# Patient Record
Sex: MALE | Race: WHITE
[De-identification: names, ages, dates, MRNs, and addresses within clinical notes are randomized per-mention and may not be internally consistent; named-entity substitution may affect disease eponyms.]

---

## 2019-05-30 ENCOUNTER — HOSPITAL ENCOUNTER (INPATIENT)
Dept: HOSPITAL 8 - ED | Age: 84
LOS: 2 days | Discharge: HOME | DRG: 281 | End: 2019-06-01
Attending: HOSPITALIST | Admitting: HOSPITALIST
Payer: MEDICARE

## 2019-05-30 VITALS — WEIGHT: 226.19 LBS | HEIGHT: 71 IN | BODY MASS INDEX: 31.67 KG/M2

## 2019-05-30 VITALS — DIASTOLIC BLOOD PRESSURE: 82 MMHG | SYSTOLIC BLOOD PRESSURE: 179 MMHG

## 2019-05-30 VITALS — DIASTOLIC BLOOD PRESSURE: 97 MMHG | SYSTOLIC BLOOD PRESSURE: 216 MMHG

## 2019-05-30 DIAGNOSIS — Z88.8: ICD-10-CM

## 2019-05-30 DIAGNOSIS — I48.92: ICD-10-CM

## 2019-05-30 DIAGNOSIS — K21.9: ICD-10-CM

## 2019-05-30 DIAGNOSIS — D69.6: ICD-10-CM

## 2019-05-30 DIAGNOSIS — G89.29: ICD-10-CM

## 2019-05-30 DIAGNOSIS — Z95.1: ICD-10-CM

## 2019-05-30 DIAGNOSIS — Z66: ICD-10-CM

## 2019-05-30 DIAGNOSIS — Z86.73: ICD-10-CM

## 2019-05-30 DIAGNOSIS — N40.0: ICD-10-CM

## 2019-05-30 DIAGNOSIS — I48.2: ICD-10-CM

## 2019-05-30 DIAGNOSIS — Z80.8: ICD-10-CM

## 2019-05-30 DIAGNOSIS — Z87.891: ICD-10-CM

## 2019-05-30 DIAGNOSIS — Z98.1: ICD-10-CM

## 2019-05-30 DIAGNOSIS — I12.9: ICD-10-CM

## 2019-05-30 DIAGNOSIS — I21.4: Primary | ICD-10-CM

## 2019-05-30 DIAGNOSIS — I25.10: ICD-10-CM

## 2019-05-30 DIAGNOSIS — Z82.49: ICD-10-CM

## 2019-05-30 DIAGNOSIS — D68.69: ICD-10-CM

## 2019-05-30 DIAGNOSIS — Z86.79: ICD-10-CM

## 2019-05-30 DIAGNOSIS — Z95.0: ICD-10-CM

## 2019-05-30 DIAGNOSIS — Z79.01: ICD-10-CM

## 2019-05-30 DIAGNOSIS — E78.5: ICD-10-CM

## 2019-05-30 DIAGNOSIS — J44.9: ICD-10-CM

## 2019-05-30 DIAGNOSIS — N18.4: ICD-10-CM

## 2019-05-30 LAB
ALBUMIN SERPL-MCNC: 3.5 G/DL (ref 3.4–5)
ANION GAP SERPL CALC-SCNC: 5 MMOL/L (ref 5–15)
BASOPHILS # BLD AUTO: 0.01 X10^3/UL (ref 0–0.1)
BASOPHILS NFR BLD AUTO: 0 % (ref 0–1)
CALCIUM SERPL-MCNC: 9.5 MG/DL (ref 8.5–10.1)
CHLORIDE SERPL-SCNC: 110 MMOL/L (ref 98–107)
CREAT SERPL-MCNC: 1.6 MG/DL (ref 0.7–1.3)
EOSINOPHIL # BLD AUTO: 0.03 X10^3/UL (ref 0–0.4)
EOSINOPHIL NFR BLD AUTO: 1 % (ref 1–7)
ERYTHROCYTE [DISTWIDTH] IN BLOOD BY AUTOMATED COUNT: 14.4 % (ref 9.4–14.8)
LYMPHOCYTES # BLD AUTO: 1.37 X10^3/UL (ref 1–3.4)
LYMPHOCYTES NFR BLD AUTO: 26 % (ref 22–44)
MCH RBC QN AUTO: 30.3 PG (ref 27.5–34.5)
MCHC RBC AUTO-ENTMCNC: 32.5 G/DL (ref 33.2–36.2)
MCV RBC AUTO: 93 FL (ref 81–97)
MD: NO
MONOCYTES # BLD AUTO: 0.68 X10^3/UL (ref 0.2–0.8)
MONOCYTES NFR BLD AUTO: 13 % (ref 2–9)
NEUTROPHILS # BLD AUTO: 3.1 X10^3/UL (ref 1.8–6.8)
NEUTROPHILS NFR BLD AUTO: 60 % (ref 42–75)
PLATELET # BLD AUTO: 115 X10^3/UL (ref 130–400)
PMV BLD AUTO: 8.2 FL (ref 7.4–10.4)
RBC # BLD AUTO: 4.73 X10^6/UL (ref 4.38–5.82)
TROPONIN I SERPL-MCNC: 0.08 NG/ML (ref 0–0.04)

## 2019-05-30 PROCEDURE — 85025 COMPLETE CBC W/AUTO DIFF WBC: CPT

## 2019-05-30 PROCEDURE — 80053 COMPREHEN METABOLIC PANEL: CPT

## 2019-05-30 PROCEDURE — 96360 HYDRATION IV INFUSION INIT: CPT

## 2019-05-30 PROCEDURE — 84100 ASSAY OF PHOSPHORUS: CPT

## 2019-05-30 PROCEDURE — 82962 GLUCOSE BLOOD TEST: CPT

## 2019-05-30 PROCEDURE — A9502 TC99M TETROFOSMIN: HCPCS

## 2019-05-30 PROCEDURE — 93005 ELECTROCARDIOGRAM TRACING: CPT

## 2019-05-30 PROCEDURE — C9898 INPNT STAY RADIOLABELED ITEM: HCPCS

## 2019-05-30 PROCEDURE — 80061 LIPID PANEL: CPT

## 2019-05-30 PROCEDURE — 84484 ASSAY OF TROPONIN QUANT: CPT

## 2019-05-30 PROCEDURE — 36415 COLL VENOUS BLD VENIPUNCTURE: CPT

## 2019-05-30 PROCEDURE — 71045 X-RAY EXAM CHEST 1 VIEW: CPT

## 2019-05-30 PROCEDURE — 83735 ASSAY OF MAGNESIUM: CPT

## 2019-05-30 PROCEDURE — 80048 BASIC METABOLIC PNL TOTAL CA: CPT

## 2019-05-30 PROCEDURE — 93017 CV STRESS TEST TRACING ONLY: CPT

## 2019-05-30 PROCEDURE — 82040 ASSAY OF SERUM ALBUMIN: CPT

## 2019-05-30 PROCEDURE — 78452 HT MUSCLE IMAGE SPECT MULT: CPT

## 2019-05-30 PROCEDURE — 85379 FIBRIN DEGRADATION QUANT: CPT

## 2019-05-30 PROCEDURE — 71275 CT ANGIOGRAPHY CHEST: CPT

## 2019-05-30 PROCEDURE — 93306 TTE W/DOPPLER COMPLETE: CPT

## 2019-05-30 RX ADMIN — LOVASTATIN SCH MG: 40 TABLET ORAL at 21:49

## 2019-05-30 RX ADMIN — CARVEDILOL SCH MG: 6.25 TABLET, FILM COATED ORAL at 19:26

## 2019-05-30 RX ADMIN — SODIUM CHLORIDE, PRESERVATIVE FREE SCH ML: 5 INJECTION INTRAVENOUS at 21:49

## 2019-05-30 RX ADMIN — APIXABAN SCH MG: 5 TABLET, FILM COATED ORAL at 21:49

## 2019-05-30 NOTE — NUR
Pt to be admitted to [facility], room 506. Report called to Jose.

## 2019-05-30 NOTE — NUR
pt upright on gurney awake & comfortable, responds approp to staff, NAD at 
rest, comfort measures provided, wife at BS, call light within reach.

## 2019-05-30 NOTE — NUR
pt remains upright on gurney awake & comfortable, responds approp to staff, NAD 
at rest, comfort measures provided, wife at BS, call light within reach.

## 2019-05-31 VITALS — SYSTOLIC BLOOD PRESSURE: 179 MMHG | DIASTOLIC BLOOD PRESSURE: 94 MMHG

## 2019-05-31 VITALS — DIASTOLIC BLOOD PRESSURE: 63 MMHG | SYSTOLIC BLOOD PRESSURE: 131 MMHG

## 2019-05-31 VITALS — SYSTOLIC BLOOD PRESSURE: 131 MMHG | DIASTOLIC BLOOD PRESSURE: 70 MMHG

## 2019-05-31 VITALS — SYSTOLIC BLOOD PRESSURE: 152 MMHG | DIASTOLIC BLOOD PRESSURE: 77 MMHG

## 2019-05-31 VITALS — SYSTOLIC BLOOD PRESSURE: 180 MMHG | DIASTOLIC BLOOD PRESSURE: 90 MMHG

## 2019-05-31 LAB
ALBUMIN SERPL-MCNC: 3.2 G/DL (ref 3.4–5)
ALP SERPL-CCNC: 75 U/L (ref 45–117)
ALT SERPL-CCNC: 19 U/L (ref 12–78)
ANION GAP SERPL CALC-SCNC: 7 MMOL/L (ref 5–15)
BASOPHILS # BLD AUTO: 0.02 X10^3/UL (ref 0–0.1)
BASOPHILS NFR BLD AUTO: 0 % (ref 0–1)
BILIRUB SERPL-MCNC: 0.6 MG/DL (ref 0.2–1)
CALCIUM SERPL-MCNC: 9.1 MG/DL (ref 8.5–10.1)
CHLORIDE SERPL-SCNC: 111 MMOL/L (ref 98–107)
CHOL/HDL RATIO: 3
CREAT SERPL-MCNC: 1.28 MG/DL (ref 0.7–1.3)
EOSINOPHIL # BLD AUTO: 0.04 X10^3/UL (ref 0–0.4)
EOSINOPHIL NFR BLD AUTO: 1 % (ref 1–7)
ERYTHROCYTE [DISTWIDTH] IN BLOOD BY AUTOMATED COUNT: 14.7 % (ref 9.4–14.8)
HDL CHOL %: 33 % (ref 26–37)
HDL CHOLESTEROL (DIRECT): 39 MG/DL (ref 40–60)
LDL CHOLESTEROL,CALCULATED: 56 MG/DL (ref 54–169)
LDLC/HDLC SERPL: 1.4 {RATIO} (ref 0.5–3)
LYMPHOCYTES # BLD AUTO: 0.95 X10^3/UL (ref 1–3.4)
LYMPHOCYTES NFR BLD AUTO: 21 % (ref 22–44)
MCH RBC QN AUTO: 30 PG (ref 27.5–34.5)
MCHC RBC AUTO-ENTMCNC: 32.2 G/DL (ref 33.2–36.2)
MCV RBC AUTO: 93 FL (ref 81–97)
MD: NO
MONOCYTES # BLD AUTO: 0.49 X10^3/UL (ref 0.2–0.8)
MONOCYTES NFR BLD AUTO: 11 % (ref 2–9)
NEUTROPHILS # BLD AUTO: 3.12 X10^3/UL (ref 1.8–6.8)
NEUTROPHILS NFR BLD AUTO: 68 % (ref 42–75)
PLATELET # BLD AUTO: 106 X10^3/UL (ref 130–400)
PMV BLD AUTO: 8.4 FL (ref 7.4–10.4)
PROT SERPL-MCNC: 7.1 G/DL (ref 6.4–8.2)
RBC # BLD AUTO: 4.71 X10^6/UL (ref 4.38–5.82)
TRIGL SERPL-MCNC: 113 MG/DL (ref 50–200)
TROPONIN I SERPL-MCNC: 0.32 NG/ML (ref 0–0.04)
TROPONIN I SERPL-MCNC: 0.4 NG/ML (ref 0–0.04)
VLDLC SERPL CALC-MCNC: 23 MG/DL (ref 0–25)

## 2019-05-31 PROCEDURE — 5A09357 ASSISTANCE WITH RESPIRATORY VENTILATION, LESS THAN 24 CONSECUTIVE HOURS, CONTINUOUS POSITIVE AIRWAY PRESSURE: ICD-10-PCS | Performed by: HOSPITALIST

## 2019-05-31 RX ADMIN — PANTOPRAZOLE SODIUM SCH MG: 40 TABLET, DELAYED RELEASE ORAL at 09:15

## 2019-05-31 RX ADMIN — LOVASTATIN SCH MG: 40 TABLET ORAL at 20:44

## 2019-05-31 RX ADMIN — SODIUM CHLORIDE, PRESERVATIVE FREE SCH ML: 5 INJECTION INTRAVENOUS at 09:15

## 2019-05-31 RX ADMIN — FINASTERIDE SCH MG: 5 TABLET, FILM COATED ORAL at 09:13

## 2019-05-31 RX ADMIN — CARVEDILOL SCH MG: 12.5 TABLET, FILM COATED ORAL at 20:44

## 2019-05-31 RX ADMIN — SODIUM CHLORIDE, PRESERVATIVE FREE SCH ML: 5 INJECTION INTRAVENOUS at 20:44

## 2019-05-31 RX ADMIN — APIXABAN SCH MG: 5 TABLET, FILM COATED ORAL at 09:15

## 2019-05-31 RX ADMIN — LISINOPRIL SCH MG: 5 TABLET ORAL at 09:15

## 2019-05-31 RX ADMIN — SERTRALINE HYDROCHLORIDE SCH MG: 50 TABLET ORAL at 09:13

## 2019-05-31 RX ADMIN — TAMSULOSIN HYDROCHLORIDE SCH MG: 0.4 CAPSULE ORAL at 09:13

## 2019-05-31 RX ADMIN — APIXABAN SCH MG: 5 TABLET, FILM COATED ORAL at 20:44

## 2019-05-31 RX ADMIN — PRIMIDONE SCH MG: 50 TABLET ORAL at 09:13

## 2019-05-31 RX ADMIN — ASPIRIN SCH MG: 81 TABLET, COATED ORAL at 06:20

## 2019-05-31 RX ADMIN — DOCUSATE SODIUM 50MG AND SENNOSIDES 8.6MG SCH TAB: 8.6; 5 TABLET, FILM COATED ORAL at 09:00

## 2019-05-31 RX ADMIN — CARVEDILOL SCH MG: 6.25 TABLET, FILM COATED ORAL at 09:14

## 2019-06-01 VITALS — DIASTOLIC BLOOD PRESSURE: 78 MMHG | SYSTOLIC BLOOD PRESSURE: 148 MMHG

## 2019-06-01 VITALS — SYSTOLIC BLOOD PRESSURE: 168 MMHG | DIASTOLIC BLOOD PRESSURE: 79 MMHG

## 2019-06-01 LAB
ALBUMIN SERPL-MCNC: 3.2 G/DL (ref 3.4–5)
ALP SERPL-CCNC: 71 U/L (ref 45–117)
ALT SERPL-CCNC: 15 U/L (ref 12–78)
ANION GAP SERPL CALC-SCNC: 2 MMOL/L (ref 5–15)
BASOPHILS # BLD AUTO: 0.03 X10^3/UL (ref 0–0.1)
BASOPHILS NFR BLD AUTO: 1 % (ref 0–1)
BILIRUB SERPL-MCNC: 0.9 MG/DL (ref 0.2–1)
CALCIUM SERPL-MCNC: 9.3 MG/DL (ref 8.5–10.1)
CHLORIDE SERPL-SCNC: 111 MMOL/L (ref 98–107)
CREAT SERPL-MCNC: 1.39 MG/DL (ref 0.7–1.3)
EOSINOPHIL # BLD AUTO: 0.04 X10^3/UL (ref 0–0.4)
EOSINOPHIL NFR BLD AUTO: 1 % (ref 1–7)
ERYTHROCYTE [DISTWIDTH] IN BLOOD BY AUTOMATED COUNT: 14.7 % (ref 9.4–14.8)
LYMPHOCYTES # BLD AUTO: 1.08 X10^3/UL (ref 1–3.4)
LYMPHOCYTES NFR BLD AUTO: 22 % (ref 22–44)
MCH RBC QN AUTO: 30 PG (ref 27.5–34.5)
MCHC RBC AUTO-ENTMCNC: 32.2 G/DL (ref 33.2–36.2)
MCV RBC AUTO: 93.4 FL (ref 81–97)
MD: NO
MONOCYTES # BLD AUTO: 0.49 X10^3/UL (ref 0.2–0.8)
MONOCYTES NFR BLD AUTO: 10 % (ref 2–9)
NEUTROPHILS # BLD AUTO: 3.31 X10^3/UL (ref 1.8–6.8)
NEUTROPHILS NFR BLD AUTO: 67 % (ref 42–75)
PLATELET # BLD AUTO: 107 X10^3/UL (ref 130–400)
PMV BLD AUTO: 8.2 FL (ref 7.4–10.4)
PROT SERPL-MCNC: 7 G/DL (ref 6.4–8.2)
RBC # BLD AUTO: 4.69 X10^6/UL (ref 4.38–5.82)

## 2019-06-01 PROCEDURE — 5A09357 ASSISTANCE WITH RESPIRATORY VENTILATION, LESS THAN 24 CONSECUTIVE HOURS, CONTINUOUS POSITIVE AIRWAY PRESSURE: ICD-10-PCS | Performed by: HOSPITALIST

## 2019-06-01 RX ADMIN — TAMSULOSIN HYDROCHLORIDE SCH MG: 0.4 CAPSULE ORAL at 08:47

## 2019-06-01 RX ADMIN — PRIMIDONE SCH MG: 50 TABLET ORAL at 08:47

## 2019-06-01 RX ADMIN — ASPIRIN SCH MG: 81 TABLET, COATED ORAL at 05:28

## 2019-06-01 RX ADMIN — FINASTERIDE SCH MG: 5 TABLET, FILM COATED ORAL at 08:48

## 2019-06-01 RX ADMIN — SODIUM CHLORIDE, PRESERVATIVE FREE SCH ML: 5 INJECTION INTRAVENOUS at 08:48

## 2019-06-01 RX ADMIN — PANTOPRAZOLE SODIUM SCH MG: 40 TABLET, DELAYED RELEASE ORAL at 08:47

## 2019-06-01 RX ADMIN — APIXABAN SCH MG: 5 TABLET, FILM COATED ORAL at 08:47

## 2019-06-01 RX ADMIN — SERTRALINE HYDROCHLORIDE SCH MG: 50 TABLET ORAL at 08:47

## 2019-06-01 RX ADMIN — LISINOPRIL SCH MG: 5 TABLET ORAL at 08:47

## 2019-06-01 RX ADMIN — CARVEDILOL SCH MG: 12.5 TABLET, FILM COATED ORAL at 08:48

## 2019-06-01 RX ADMIN — DOCUSATE SODIUM 50MG AND SENNOSIDES 8.6MG SCH TAB: 8.6; 5 TABLET, FILM COATED ORAL at 08:48
